# Patient Record
Sex: FEMALE | Race: OTHER | HISPANIC OR LATINO
[De-identification: names, ages, dates, MRNs, and addresses within clinical notes are randomized per-mention and may not be internally consistent; named-entity substitution may affect disease eponyms.]

---

## 2017-12-04 ENCOUNTER — APPOINTMENT (OUTPATIENT)
Dept: PULMONOLOGY | Facility: CLINIC | Age: 72
End: 2017-12-04

## 2022-06-04 ENCOUNTER — APPOINTMENT (OUTPATIENT)
Dept: ORTHOPEDIC SURGERY | Facility: CLINIC | Age: 77
End: 2022-06-04
Payer: MEDICARE

## 2022-06-04 VITALS — BODY MASS INDEX: 23.18 KG/M2 | WEIGHT: 115 LBS | HEIGHT: 59 IN

## 2022-06-04 DIAGNOSIS — M19.90 UNSPECIFIED OSTEOARTHRITIS, UNSPECIFIED SITE: ICD-10-CM

## 2022-06-04 PROCEDURE — 73030 X-RAY EXAM OF SHOULDER: CPT | Mod: RT

## 2022-06-04 PROCEDURE — 20610 DRAIN/INJ JOINT/BURSA W/O US: CPT

## 2022-06-04 PROCEDURE — 99204 OFFICE O/P NEW MOD 45 MIN: CPT | Mod: 25

## 2022-06-04 PROCEDURE — J3490M: CUSTOM

## 2022-06-04 NOTE — IMAGING
[Right] : right shoulder [There are no fractures, subluxations or dislocations. No significant abnormalities are seen] : There are no fractures, subluxations or dislocations. No significant abnormalities are seen

## 2022-06-04 NOTE — HISTORY OF PRESENT ILLNESS
[8] : 8 [0] : 0 [Dull/Aching] : dull/aching [Sharp] : sharp [Intermittent] : intermittent [Retired] : Work status: retired [de-identified] : 2 months of right shoulder pain. RHD female. No injury. Pain over lateral shoulder. Pain with ROM. No prior shoulder issues.\par \par PMH: none [] : Post Surgical Visit: no [FreeTextEntry1] : Right shoulder [FreeTextEntry5] : patient is here with shoulder pain for about 2 months\par no injury [de-identified] : movement

## 2022-06-04 NOTE — PHYSICAL EXAM
[Right] : right shoulder [4 ___] : forward flexion 4[unfilled]/5 [4___] : abduction 4[unfilled]/5 [] : full strength is not present in all planes of motion [TWNoteComboBox7] : active forward flexion 110 degrees

## 2022-06-04 NOTE — PROCEDURE
[Large Joint Injection] : Large joint injection [Right] : of the right [Subacromial Space] : subacromial space [Pain] : pain [Inflammation] : inflammation [Alcohol] : alcohol [Betadine] : betadine [___ cc    1%] : Lidocaine ~Vcc of 1%  [___ cc    0.25%] : Bupivacaine (Marcaine) ~Vcc of 0.25%  [___ cc    10mg] : Triamcinolone (Kenalog) ~Vcc of 10 mg  [] : Patient tolerated procedure well [Call if redness, pain or fever occur] : call if redness, pain or fever occur [Apply ice for 15min out of every hour for the next 12-24 hours as tolerated] : apply ice for 15 minutes out of every hour for the next 12-24 hours as tolerated [Patient was advised to rest the joint(s) for ____ days] : patient was advised to rest the joint(s) for [unfilled] days [Previous OTC use and PT nontherapeutic] : patient has tried OTC's including aspirin, Ibuprofen, Aleve, etc or prescription NSAIDS, and/or exercises at home and/or physical therapy without satisfactory response [Patient had decreased mobility in the joint] : patient had decreased mobility in the joint [Risks, benefits, alternatives discussed / Verbal consent obtained] : the risks benefits, and alternatives have been discussed, and verbal consent was obtained

## 2022-06-04 NOTE — ASSESSMENT
[FreeTextEntry1] : Large joint inj tolerated well\par fu 3-4 weeks with shoudler specialist-consider mri if not improved

## 2022-06-30 ENCOUNTER — APPOINTMENT (OUTPATIENT)
Dept: ORTHOPEDIC SURGERY | Facility: CLINIC | Age: 77
End: 2022-06-30

## 2022-07-07 ENCOUNTER — APPOINTMENT (OUTPATIENT)
Dept: ORTHOPEDIC SURGERY | Facility: CLINIC | Age: 77
End: 2022-07-07

## 2022-07-07 VITALS — HEIGHT: 59 IN | BODY MASS INDEX: 23.18 KG/M2 | WEIGHT: 115 LBS

## 2022-07-07 DIAGNOSIS — M75.41 IMPINGEMENT SYNDROME OF RIGHT SHOULDER: ICD-10-CM

## 2022-07-07 PROCEDURE — 99213 OFFICE O/P EST LOW 20 MIN: CPT

## 2022-07-07 NOTE — ASSESSMENT
[FreeTextEntry1] : ATRAUMATIC RIGHT SHOULDER PAIN, GOOD RELIEF FROM CSI 1 MONTH AGO\par PRIOR XRAYS NEGATIVE\par PT RX\par CONSIDER MRI IF SYMPTOMS PERSIST\par \par I explained to the patient that OTC pain medication, ice, elevation, compression and rest would benefit them. They may return to activity after follow-up or when they no longer have any pain.\par \par Patient is to begin over the counter oral anti-inflammatory medications on an as needed basis, as long as there are no medical contra-indications. Patient is counseled on possible GI and blood pressure side effects.\par \par

## 2022-07-07 NOTE — HISTORY OF PRESENT ILLNESS
[de-identified] : 07/07/2022:  Ms. GUTHRIE IS A 76 year YEAR OLD female PRESENTS TODAY FOR RIGHT SHOULDER. SAW DR. PRATHER 4 WEEKS AGO, HAD CSI AND FEELING MUCH BETTER WITH BETTER MOBILITY. STILL HAS ANTERIOR PAIN, WORSE WITH REACHING BEHIND BACK. [FreeTextEntry1] : rt shoulder [FreeTextEntry5] : pt is here for r shoulder pain. no known injury, pt fell a long time ago and thinks its from that. pt had inj in the shoulder with dr. nuñez, says shoulder is feeling better, sometimes has pain. pain when putting arm behind back

## 2022-07-07 NOTE — IMAGING
[de-identified] : RIGHT SHOULDER\par 1. No swelling, no ecchymosis, no deformity, no scapular winging.\par 2. ANTERIOR tenderness to palpation NO TENDERNESS OVER AC joint or trapezius.\par 3. Forward flexion to 180; external rotation to 90 degrees (with shoulder abducted); internal rotation to 70 degrees (with shoulder abducted)\par 4. 5/5 supraspinatus, infraspinatus and subscapularis\par 5. Negative Yoder test, negative impingement sign, negative O’Audie test.\par 6. Speed’s and yergason negative\par 7. Motor and sensory intact distally\par

## 2024-09-20 ENCOUNTER — APPOINTMENT (OUTPATIENT)
Dept: ORTHOPEDIC SURGERY | Facility: CLINIC | Age: 79
End: 2024-09-20
Payer: MEDICARE

## 2024-09-20 VITALS — HEIGHT: 59 IN | WEIGHT: 115 LBS | BODY MASS INDEX: 23.18 KG/M2

## 2024-09-20 DIAGNOSIS — M54.16 RADICULOPATHY, LUMBAR REGION: ICD-10-CM

## 2024-09-20 DIAGNOSIS — I10 ESSENTIAL (PRIMARY) HYPERTENSION: ICD-10-CM

## 2024-09-20 PROCEDURE — 99214 OFFICE O/P EST MOD 30 MIN: CPT

## 2024-09-20 RX ORDER — MECLIZINE HYDROCHLORIDE 25 MG/1
TABLET ORAL
Refills: 0 | Status: ACTIVE | COMMUNITY

## 2024-09-20 RX ORDER — MONTELUKAST 10 MG/1
10 TABLET, FILM COATED ORAL
Refills: 0 | Status: ACTIVE | COMMUNITY

## 2024-09-20 RX ORDER — METHYLPREDNISOLONE 4 MG/1
4 TABLET ORAL
Qty: 1 | Refills: 0 | Status: ACTIVE | COMMUNITY
Start: 2024-09-20 | End: 1900-01-01

## 2024-09-20 RX ORDER — AMLODIPINE BESYLATE 5 MG/1
5 TABLET ORAL
Refills: 0 | Status: ACTIVE | COMMUNITY

## 2024-09-20 NOTE — DATA REVIEWED
[MRI] : MRI [Cervical Spine] : cervical spine [I independently reviewed and interpreted images and report] : I independently reviewed and interpreted images and report [FreeTextEntry1] : From [facility].  Uploaded in Pacs. multilevel degen changes with central stenosis .kyphotic alignment. Hs cord impingement without compression. No cord signal change

## 2024-09-20 NOTE — IMAGING
[de-identified] : Spine: Inspection/Palpation: No tenderness to palpation throughout Cervical/thoracic/lumbar spine. No bony stepoffs, No lesions.   Gait: antalgic Able to perform tandem gait.     Neurologic: Bilateral upper extremities 5/5 Deltoid/Biceps/Triceps/ Wrist Flexion/Wrist Extension/ / Intrinsics Except Bilateral Lower Extremities 5/5 Iliopsoas/Quadriceps/Hamstrings/ Tibialis Anterior/ Gastrocnemius. Extensor Hallucis Longus/ Flexor Hallucis Longus except   Sensation intact to light touch C5-T1 Sensation intact to light touch L2-S1     Negative Romberg  Negative Gould's,   Negative straight leg raise     [Outside films reviewed] : Outside films reviewed [Facet arthropathy] : Facet arthropathy [Disc space narrowing] : Disc space narrowing

## 2024-09-20 NOTE — ASSESSMENT
[FreeTextEntry1] : 78 F with LBP and LLE Radic.  HAd issues with dizziness went TO er found to have cervical stenosis. No issues with fine motor movement or dexterity. No issues with fever sor chills. no arm s symptoms.  At that time room was spinning making it difficult to walk. 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  Conservative care  MDP FU after MRI

## 2024-09-20 NOTE — HISTORY OF PRESENT ILLNESS
[Neck] : neck [8] : 8 [de-identified] : 09/20/2024: 78-year-old female presenting with neck pain. No known injury/fall. Reports she was dizzy one week ago. Saw PCP, sent to [place] ED- imaging done. States the hospital referred her to spine specialist. Reports tight sharp pain.  Had Stroke workup and MRI c spine.  Since dizziness and diffiiculty walking ahs resolved.  NO issues with fine motor movement or dexterity.,  States the difficulty walking secondary to dizziness and felt room spinning. No bowel or bladder symptoms. No fevers or chills.  [] : no

## 2024-09-25 ENCOUNTER — APPOINTMENT (OUTPATIENT)
Dept: MRI IMAGING | Facility: CLINIC | Age: 79
End: 2024-09-25
Payer: MEDICARE

## 2024-09-25 PROCEDURE — 72148 MRI LUMBAR SPINE W/O DYE: CPT

## 2024-10-18 ENCOUNTER — APPOINTMENT (OUTPATIENT)
Dept: ORTHOPEDIC SURGERY | Facility: CLINIC | Age: 79
End: 2024-10-18
Payer: MEDICARE

## 2024-10-18 VITALS — HEIGHT: 59 IN | WEIGHT: 100 LBS | BODY MASS INDEX: 20.16 KG/M2

## 2024-10-18 DIAGNOSIS — M54.16 RADICULOPATHY, LUMBAR REGION: ICD-10-CM

## 2024-10-18 PROCEDURE — 99213 OFFICE O/P EST LOW 20 MIN: CPT
